# Patient Record
Sex: MALE | Race: WHITE | NOT HISPANIC OR LATINO | Employment: PART TIME | URBAN - METROPOLITAN AREA
[De-identification: names, ages, dates, MRNs, and addresses within clinical notes are randomized per-mention and may not be internally consistent; named-entity substitution may affect disease eponyms.]

---

## 2024-07-15 ENCOUNTER — OFFICE VISIT (OUTPATIENT)
Dept: PRIMARY CARE | Facility: CLINIC | Age: 24
End: 2024-07-15
Payer: COMMERCIAL

## 2024-07-15 DIAGNOSIS — K64.0 GRADE I INTERNAL HEMORRHOIDS: Primary | ICD-10-CM

## 2024-07-15 DIAGNOSIS — K64.8 INTERNAL BLEEDING HEMORRHOIDS: ICD-10-CM

## 2024-07-15 DIAGNOSIS — E66.9 CLASS 2 OBESITY WITH BODY MASS INDEX (BMI) OF 36.0 TO 36.9 IN ADULT, UNSPECIFIED OBESITY TYPE, UNSPECIFIED WHETHER SERIOUS COMORBIDITY PRESENT: ICD-10-CM

## 2024-07-15 DIAGNOSIS — K92.1 BLOOD IN STOOL: ICD-10-CM

## 2024-07-15 PROCEDURE — 99212 OFFICE O/P EST SF 10 MIN: CPT | Performed by: NURSE PRACTITIONER

## 2024-07-15 RX ORDER — TRIAMCINOLONE ACETONIDE 1 MG/G
CREAM TOPICAL 2 TIMES DAILY
Qty: 30 G | Refills: 1 | Status: SHIPPED | OUTPATIENT
Start: 2024-07-15

## 2024-07-15 ASSESSMENT — PAIN SCALES - GENERAL: PAINLEVEL: 3

## 2024-07-15 NOTE — PROGRESS NOTES
"Subjective   Patient ID: Brown Fajardo is a 24 y.o. male who presents for Hemorrhoids.    Pt is in office due to possible hemorrhoids. He states he has pain and discomfort. States this a reoccurring problem.         Review of Systems    Objective   /90 (BP Location: Right arm, Patient Position: Sitting, BP Cuff Size: Large adult)   Pulse 96   Temp 36.8 °C (98.2 °F) (Temporal)   Resp 16   Ht 1.803 m (5' 11\")   Wt 120 kg (264 lb)   SpO2 100%   BMI 36.82 kg/m²     Physical Exam    Assessment/Plan          "

## 2024-07-15 NOTE — PROGRESS NOTES
Subjective   Patient ID: Brown Fajardo is a 24 y.o. male who is with chief complaint of blood in the stools.    HPI  Patient is a 24 y.o. male who CONSULTED AT [facility] today. Patient who is with complaint of blood in the stools which started 2 weeks ago. He described the bleeding as streaks and drips of fresh blood. He has no rectal pain, abdominal pain, fever, chills, diarrhea, nor constipation. He self medicated with preparation H and the symptom disappears. However, 2 days ago, he started to have rectal bleeding again. He has history of hemorrhoids before.     Review of Systems  General: no weight loss, generally healthy, no fatigue  Head:  no headaches / sinus pain, no vertigo, no injury  Eyes: no diplopia, no tearing, no pain,   Ears: no change in hearing, no tinnitus, no bleeding, no vertigo  Mouth:  no dental difficulties, no gingival bleeding, no sore throat, no loss of sense of taste  Nose: no congestion, no  discharge, no bleeding, no obstruction, no loss of sense of smell  Neck: no stiffness, no pain, no tenderness, no masses, no bruit  Pulmonary: no dyspnea, no wheezing, no hemoptysis, no cough  Cardiovascular: no chest pain, no palpitations, no syncope, no orthopnea  Gastrointestinal: no change in appetite, no dysphagia, no abdominal pains, no diarrhea, no emesis, no melena, (+) fresh blood in the stools.   Genito Urinary: no dysuria, no urinary urgency, no nocturia, no incontinence, no change in nature of urine  Musculoskeletal: no muscle ache, no joint pain, no limitation of range of motion, no paresthesia, no numbness  Constitutional: no fever, no chills, no night sweats    Objective   Physical Exam  General: ambulatory, in no acute distress  Head: normocephalic, no lesions  Eyes: pink palpebral conjunctiva, anicteric sclerae, PERRLA, EOM's full  Abdomen: flat, NABS, soft, no direct tenderness, no rebound tenderness, no mass palpated, SIGNS: no Elizabeth, no Rovsings,  no Psoas, no Obturator; No CVA tenderness,  Rectal exam: no perianal mass, no erythema, good sphincteric tone, (+) soft, slightly tender mass on the rectal canal: above sphincter, on the 6 o clock position, smooth surface, well defined border, underneath rectal mucosa, non tender,;;;; no blood, (+) stool on withdrawal of examining finger  Musculoskeletal: no limitation of range of motion, no paralysis, no deformity  Extremities: full and equal peripheral pulses, no edema,    Assessment/Plan   Problem List Items Addressed This Visit    None  Visit Diagnoses         Codes    Grade I internal hemorrhoids    -  Primary K64.0    Relevant Medications    triamcinolone (Kenalog) 0.1 % cream    Internal bleeding hemorrhoids     K64.8    Relevant Medications    triamcinolone (Kenalog) 0.1 % cream    Blood in stool     K92.1    Relevant Medications    triamcinolone (Kenalog) 0.1 % cream    Body mass index (BMI) of 36.0 to 36.9 in adult     Z68.36    Class 2 obesity with body mass index (BMI) of 36.0 to 36.9 in adult, unspecified obesity type, unspecified whether serious comorbidity present     E66.9, Z68.36        DISCHARGE SUMMARY:   Patient was seen and examined. Diagnosis, treatment, treatment options, and possible complications of today's illness discussed and explained to patient. Patient to take medication/s associated with this visit. Patient may also take OTC analgesic as needed for pain. Advised high fiber diet, exercise, and increased oral fluid intake.  Educated and advised on use of hot sitz bath. Advised to come back if with worsening or persistent symptoms. Patient verbalized understanding of plan of care.    Patient to come back in 7 - 10 days if needed for worsening symptoms.           MUKESH Peres-CNP 07/15/24 4:26 PM

## 2024-07-16 VITALS
RESPIRATION RATE: 16 BRPM | TEMPERATURE: 98.2 F | DIASTOLIC BLOOD PRESSURE: 80 MMHG | HEART RATE: 96 BPM | OXYGEN SATURATION: 100 % | BODY MASS INDEX: 36.96 KG/M2 | WEIGHT: 264 LBS | HEIGHT: 71 IN | SYSTOLIC BLOOD PRESSURE: 128 MMHG

## 2024-07-16 ASSESSMENT — ENCOUNTER SYMPTOMS
DEPRESSION: 0
LOSS OF SENSATION IN FEET: 0
OCCASIONAL FEELINGS OF UNSTEADINESS: 0

## 2024-07-16 ASSESSMENT — PATIENT HEALTH QUESTIONNAIRE - PHQ9
SUM OF ALL RESPONSES TO PHQ9 QUESTIONS 1 AND 2: 0
1. LITTLE INTEREST OR PLEASURE IN DOING THINGS: NOT AT ALL
2. FEELING DOWN, DEPRESSED OR HOPELESS: NOT AT ALL

## 2024-07-16 NOTE — PATIENT INSTRUCTIONS
DISCHARGE SUMMARY:   Patient was seen and examined. Diagnosis, treatment, treatment options, and possible complications of today's illness discussed and explained to patient. Patient to take medication/s associated with this visit. Patient may also take OTC analgesic as needed for pain. Advised high fiber diet, exercise, and increased oral fluid intake.  Educated and advised on use of hot sitz bath. Advised to come back if with worsening or persistent symptoms. Patient verbalized understanding of plan of care.    Patient to come back in 7 - 10 days if needed for worsening symptoms.

## 2024-08-15 ENCOUNTER — OFFICE VISIT (OUTPATIENT)
Dept: PRIMARY CARE | Facility: CLINIC | Age: 24
End: 2024-08-15
Payer: COMMERCIAL

## 2024-08-15 VITALS
HEIGHT: 71 IN | SYSTOLIC BLOOD PRESSURE: 120 MMHG | TEMPERATURE: 98.6 F | WEIGHT: 264.55 LBS | DIASTOLIC BLOOD PRESSURE: 80 MMHG | OXYGEN SATURATION: 99 % | HEART RATE: 78 BPM | BODY MASS INDEX: 37.04 KG/M2

## 2024-08-15 DIAGNOSIS — K62.89 ANAL PAIN: Primary | ICD-10-CM

## 2024-08-15 DIAGNOSIS — E66.9 CLASS 2 OBESITY WITH BODY MASS INDEX (BMI) OF 36.0 TO 36.9 IN ADULT, UNSPECIFIED OBESITY TYPE, UNSPECIFIED WHETHER SERIOUS COMORBIDITY PRESENT: ICD-10-CM

## 2024-08-15 DIAGNOSIS — K64.0 GRADE I INTERNAL HEMORRHOIDS: ICD-10-CM

## 2024-08-15 DIAGNOSIS — K60.2 ANAL FISSURE: ICD-10-CM

## 2024-08-15 DIAGNOSIS — K92.1 BLOOD IN STOOL: ICD-10-CM

## 2024-08-15 DIAGNOSIS — K64.8 INTERNAL BLEEDING HEMORRHOIDS: ICD-10-CM

## 2024-08-15 PROCEDURE — 99212 OFFICE O/P EST SF 10 MIN: CPT | Performed by: NURSE PRACTITIONER

## 2024-08-15 RX ORDER — HYDROCORTISONE 25 MG/G
CREAM TOPICAL 2 TIMES DAILY
Qty: 30 G | Refills: 1 | Status: SHIPPED | OUTPATIENT
Start: 2024-08-15 | End: 2024-08-29

## 2024-08-15 ASSESSMENT — ENCOUNTER SYMPTOMS
DEPRESSION: 0
LOSS OF SENSATION IN FEET: 0
OCCASIONAL FEELINGS OF UNSTEADINESS: 0

## 2024-08-15 ASSESSMENT — PAIN SCALES - GENERAL: PAINLEVEL: 3

## 2024-08-15 ASSESSMENT — PATIENT HEALTH QUESTIONNAIRE - PHQ9
2. FEELING DOWN, DEPRESSED OR HOPELESS: NOT AT ALL
SUM OF ALL RESPONSES TO PHQ9 QUESTIONS 1 AND 2: 0
1. LITTLE INTEREST OR PLEASURE IN DOING THINGS: NOT AT ALL

## 2024-08-15 NOTE — PROGRESS NOTES
Subjective   Patient ID: Brown Fajardo is a 24 y.o. male who is here for follow up with regards to his hemorrhoids.    HPI  Patient is a 24 y.o. male who CONSULTED AT [facility] today. Patient was seen at this clinic about 1 month ago and was diagnosed to have internal hemorrhoids and was given triamcinolone cream. H is now here for follow up visit for this condition.    Interval history: Patient states that since last visit, his symptoms were initially better but he now again has fresh blood in the stools becoming more often. He also has some pain in the anal area and he can feel a small break in the skin adjacent to the anal opening. This is painful and tender.     Review of Systems  General: no weight loss, generally healthy, no fatigue  Head:  no headaches / sinus pain, no vertigo, no injury  Eyes: no diplopia, no tearing, no pain,   Ears: no change in hearing, no tinnitus, no bleeding, no vertigo  Mouth:  no dental difficulties, no gingival bleeding, no sore throat, no loss of sense of taste  Nose: no congestion, no  discharge, no bleeding, no obstruction, no loss of sense of smell  Neck: no stiffness, no pain, no tenderness, no masses, no bruit  Pulmonary: no dyspnea, no wheezing, no hemoptysis, no cough  Cardiovascular: no chest pain, no palpitations, no syncope, no orthopnea  Gastrointestinal: no change in appetite, no dysphagia, no abdominal pains, no diarrhea, no emesis, no melena, (+) anal pain and tenderness, (+) rectal bleeding  Genito Urinary: no dysuria, no urinary urgency, no nocturia, no incontinence, no change in nature of urine  Musculoskeletal: no muscle ache, no joint pain, no limitation of range of motion, no paresthesia, no numbness  Constitutional: no fever, no chills, no night sweats    Objective   Physical Exam  General: ambulatory, in no acute distress  Head: normocephalic, no lesions  Eyes: pink palpebral conjunctiva, anicteric sclerae, PERRLA, EOM's  full  Ears: clear external auditory canals, no ear discharge, no bleeding from the ears, tympanic membrane intact  Nose: nasal mucosa normal, no nasal discharge, no bleeding, no obstruction  Throat: clear, no exudate, no lesions  Neck: supple, no masses, no bruits  Chest: symmetrical chest expansion, no lagging, no retractions, clear breath sounds, no rales, no wheezes  Heart: normal rate, regular rhythm, no heaves, no thrills, no murmurs  Abdomen: soft, non-tender, normoactive bowel sounds, no mass palpated  Musculoskeletal: no limitation of range of motion, no paralysis, no deformity  Extremities: full and equal peripheral pulses, no edema,  Rectal exam: no perianal mass, no erythema, good sphincteric tone, (+) small break in the skin on the anal area (anal fissure) measuring 0.5 cm, tender, no bleeding, partial thickness, (+) soft, slightly tender mass on the rectal canal: above sphincter, on the 6 o clock position, smooth surface, well defined border, underneath rectal mucosa, non tender,;;;; no blood, (+) stool on withdrawal of examining finger     Assessment/Plan   Problem List Items Addressed This Visit    None  Visit Diagnoses         Codes    Anal pain    -  Primary K62.89    Relevant Medications    hydrocortisone 2.5 % cream    Grade I internal hemorrhoids     K64.0    Relevant Medications    hydrocortisone 2.5 % cream    Internal bleeding hemorrhoids     K64.8    Relevant Medications    hydrocortisone 2.5 % cream    Blood in stool     K92.1    Relevant Medications    hydrocortisone 2.5 % cream    Anal fissure     K60.2    Relevant Medications    hydrocortisone 2.5 % cream    BMI 36.0-36.9,adult     Z68.36    Class 2 obesity with body mass index (BMI) of 36.0 to 36.9 in adult, unspecified obesity type, unspecified whether serious comorbidity present     E66.9, Z68.36        DISCHARGE SUMMARY:   Patient was seen and examined. Diagnosis, treatment, treatment options, and possible complications of today's  illness discussed and explained to patient. Patient to take medication/s associated with this visit. Patient may also take OTC analgesic as needed for pain. Advised high fiber diet, exercise, and increased oral fluid intake.  Educated and advised on use of hot sitz bath. Advised to come back if with worsening or persistent symptoms. Patient verbalized understanding of plan of care.    Patient to come back in 7 - 10 days if needed for worsening symptoms.           MUKESH Peres-CNP 08/15/24 3:29 PM

## 2024-08-21 ASSESSMENT — PATIENT HEALTH QUESTIONNAIRE - PHQ9
SUM OF ALL RESPONSES TO PHQ9 QUESTIONS 1 AND 2: 0
2. FEELING DOWN, DEPRESSED OR HOPELESS: NOT AT ALL
1. LITTLE INTEREST OR PLEASURE IN DOING THINGS: NOT AT ALL

## 2024-09-27 PROBLEM — R00.2 PALPITATIONS: Status: ACTIVE | Noted: 2024-09-27

## 2024-09-27 PROBLEM — R68.89 FLU-LIKE SYMPTOMS: Status: ACTIVE | Noted: 2024-09-27

## 2024-09-27 PROBLEM — R79.89 HIGH THYROID STIMULATING HORMONE (TSH) LEVEL: Status: ACTIVE | Noted: 2024-09-27

## 2024-09-27 PROBLEM — J02.9 SORE THROAT: Status: ACTIVE | Noted: 2024-09-27

## 2024-09-27 PROBLEM — H66.92 ACUTE LEFT OTITIS MEDIA: Status: ACTIVE | Noted: 2024-09-27

## 2024-10-01 ENCOUNTER — APPOINTMENT (OUTPATIENT)
Dept: PRIMARY CARE | Facility: CLINIC | Age: 24
End: 2024-10-01
Payer: COMMERCIAL

## 2024-10-01 ENCOUNTER — LAB (OUTPATIENT)
Dept: LAB | Facility: LAB | Age: 24
End: 2024-10-01
Payer: COMMERCIAL

## 2024-10-01 VITALS
TEMPERATURE: 97.5 F | WEIGHT: 272 LBS | SYSTOLIC BLOOD PRESSURE: 110 MMHG | RESPIRATION RATE: 14 BRPM | DIASTOLIC BLOOD PRESSURE: 70 MMHG | HEART RATE: 110 BPM | OXYGEN SATURATION: 98 % | BODY MASS INDEX: 38.08 KG/M2 | HEIGHT: 71 IN

## 2024-10-01 DIAGNOSIS — Z00.00 HEALTHCARE MAINTENANCE: Primary | ICD-10-CM

## 2024-10-01 DIAGNOSIS — E66.812 CLASS 2 SEVERE OBESITY DUE TO EXCESS CALORIES WITH SERIOUS COMORBIDITY AND BODY MASS INDEX (BMI) OF 37.0 TO 37.9 IN ADULT: ICD-10-CM

## 2024-10-01 DIAGNOSIS — E66.01 CLASS 2 SEVERE OBESITY DUE TO EXCESS CALORIES WITH SERIOUS COMORBIDITY AND BODY MASS INDEX (BMI) OF 37.0 TO 37.9 IN ADULT: ICD-10-CM

## 2024-10-01 DIAGNOSIS — Z00.00 HEALTHCARE MAINTENANCE: ICD-10-CM

## 2024-10-01 DIAGNOSIS — K60.1 CHRONIC POSTERIOR ANAL FISSURE: ICD-10-CM

## 2024-10-01 DIAGNOSIS — R63.5 WEIGHT GAIN: ICD-10-CM

## 2024-10-01 PROBLEM — R68.89 FLU-LIKE SYMPTOMS: Status: RESOLVED | Noted: 2024-09-27 | Resolved: 2024-10-01

## 2024-10-01 PROBLEM — J02.9 SORE THROAT: Status: RESOLVED | Noted: 2024-09-27 | Resolved: 2024-10-01

## 2024-10-01 PROBLEM — H66.92 ACUTE LEFT OTITIS MEDIA: Status: RESOLVED | Noted: 2024-09-27 | Resolved: 2024-10-01

## 2024-10-01 PROBLEM — K60.2 ANAL FISSURE: Status: ACTIVE | Noted: 2024-10-01

## 2024-10-01 PROBLEM — R00.2 PALPITATIONS: Status: RESOLVED | Noted: 2024-09-27 | Resolved: 2024-10-01

## 2024-10-01 LAB
ALBUMIN SERPL BCP-MCNC: 4.7 G/DL (ref 3.4–5)
ALP SERPL-CCNC: 60 U/L (ref 33–120)
ALT SERPL W P-5'-P-CCNC: 38 U/L (ref 10–52)
ANION GAP SERPL CALC-SCNC: 13 MMOL/L (ref 10–20)
AST SERPL W P-5'-P-CCNC: 19 U/L (ref 9–39)
BASOPHILS # BLD AUTO: 0.02 X10*3/UL (ref 0–0.1)
BASOPHILS NFR BLD AUTO: 0.3 %
BILIRUB SERPL-MCNC: 0.5 MG/DL (ref 0–1.2)
BUN SERPL-MCNC: 14 MG/DL (ref 6–23)
CALCIUM SERPL-MCNC: 9.8 MG/DL (ref 8.6–10.3)
CHLORIDE SERPL-SCNC: 105 MMOL/L (ref 98–107)
CHOLEST SERPL-MCNC: 217 MG/DL (ref 0–199)
CHOLESTEROL/HDL RATIO: 5.5
CO2 SERPL-SCNC: 27 MMOL/L (ref 21–32)
CREAT SERPL-MCNC: 1.11 MG/DL (ref 0.5–1.3)
EGFRCR SERPLBLD CKD-EPI 2021: >90 ML/MIN/1.73M*2
EOSINOPHIL # BLD AUTO: 0.06 X10*3/UL (ref 0–0.7)
EOSINOPHIL NFR BLD AUTO: 0.9 %
ERYTHROCYTE [DISTWIDTH] IN BLOOD BY AUTOMATED COUNT: 12 % (ref 11.5–14.5)
GLUCOSE SERPL-MCNC: 95 MG/DL (ref 74–99)
HCT VFR BLD AUTO: 46.5 % (ref 41–52)
HDLC SERPL-MCNC: 39.3 MG/DL
HGB BLD-MCNC: 16.3 G/DL (ref 13.5–17.5)
IMM GRANULOCYTES # BLD AUTO: 0.02 X10*3/UL (ref 0–0.7)
IMM GRANULOCYTES NFR BLD AUTO: 0.3 % (ref 0–0.9)
LDLC SERPL CALC-MCNC: 141 MG/DL
LYMPHOCYTES # BLD AUTO: 2.3 X10*3/UL (ref 1.2–4.8)
LYMPHOCYTES NFR BLD AUTO: 34.7 %
MCH RBC QN AUTO: 30.1 PG (ref 26–34)
MCHC RBC AUTO-ENTMCNC: 35.1 G/DL (ref 32–36)
MCV RBC AUTO: 86 FL (ref 80–100)
MONOCYTES # BLD AUTO: 0.53 X10*3/UL (ref 0.1–1)
MONOCYTES NFR BLD AUTO: 8 %
NEUTROPHILS # BLD AUTO: 3.7 X10*3/UL (ref 1.2–7.7)
NEUTROPHILS NFR BLD AUTO: 55.8 %
NON HDL CHOLESTEROL: 178 MG/DL (ref 0–149)
NRBC BLD-RTO: 0 /100 WBCS (ref 0–0)
PLATELET # BLD AUTO: 289 X10*3/UL (ref 150–450)
POTASSIUM SERPL-SCNC: 4.7 MMOL/L (ref 3.5–5.3)
PROT SERPL-MCNC: 7.6 G/DL (ref 6.4–8.2)
RBC # BLD AUTO: 5.41 X10*6/UL (ref 4.5–5.9)
SODIUM SERPL-SCNC: 140 MMOL/L (ref 136–145)
TRIGL SERPL-MCNC: 185 MG/DL (ref 0–149)
TSH SERPL-ACNC: 3.08 MIU/L (ref 0.44–3.98)
VLDL: 37 MG/DL (ref 0–40)
WBC # BLD AUTO: 6.6 X10*3/UL (ref 4.4–11.3)

## 2024-10-01 PROCEDURE — 3008F BODY MASS INDEX DOCD: CPT | Performed by: FAMILY MEDICINE

## 2024-10-01 PROCEDURE — 36415 COLL VENOUS BLD VENIPUNCTURE: CPT

## 2024-10-01 PROCEDURE — 85025 COMPLETE CBC W/AUTO DIFF WBC: CPT

## 2024-10-01 PROCEDURE — 99395 PREV VISIT EST AGE 18-39: CPT | Performed by: FAMILY MEDICINE

## 2024-10-01 PROCEDURE — 84443 ASSAY THYROID STIM HORMONE: CPT

## 2024-10-01 PROCEDURE — 80061 LIPID PANEL: CPT

## 2024-10-01 PROCEDURE — 80053 COMPREHEN METABOLIC PANEL: CPT

## 2024-10-01 PROCEDURE — 1036F TOBACCO NON-USER: CPT | Performed by: FAMILY MEDICINE

## 2024-10-01 ASSESSMENT — ENCOUNTER SYMPTOMS
NUMBNESS: 0
ANOREXIA: 0
COUGH: 0
ARTHRALGIAS: 0
RHINORRHEA: 0
JOINT SWELLING: 0
DIARRHEA: 0
SORE THROAT: 0
TREMORS: 0
FREQUENCY: 0
PALPITATIONS: 0
WHEEZING: 0
HEMATURIA: 0
MYALGIAS: 0
DIZZINESS: 0
NAUSEA: 0
ABDOMINAL PAIN: 0
WEAKNESS: 0
VISUAL CHANGE: 0
NECK PAIN: 0
CHILLS: 0
SWOLLEN GLANDS: 0
DYSURIA: 0
DIAPHORESIS: 0
CONSTIPATION: 0
FATIGUE: 0
HEADACHES: 0
CHANGE IN BOWEL HABIT: 1
FEVER: 0
SHORTNESS OF BREATH: 0
VERTIGO: 0
HEMATOCHEZIA: 1
VOMITING: 0

## 2024-10-01 NOTE — PATIENT INSTRUCTIONS
BMI was above normal measurement. Current weight: 123 kg (272 lb)  Weight change since last visit (-) denotes wt loss 7.45 lbs   Weight loss needed to achieve BMI 25: 93.1 Lbs  Weight loss needed to achieve BMI 30: 57.4 Lbs  Advised to Increase physical activity.

## 2024-10-01 NOTE — PROGRESS NOTES
"Subjective   Patient ID: Brown Fajardo is a 24 y.o. male who presents for Annual Exam and Rectal Bleeding.    Patient presents today for annual physical exam.    Rectal Bleeding  This is a new problem. The current episode started more than 1 month ago. The problem occurs intermittently. The problem has been waxing and waning. Associated symptoms include a change in bowel habit. Pertinent negatives include no abdominal pain, anorexia, arthralgias, chest pain, chills, congestion, coughing, diaphoresis, fatigue, fever, headaches, joint swelling, myalgias, nausea, neck pain, numbness, rash, sore throat, swollen glands, urinary symptoms, vertigo, visual change, vomiting or weakness. Associated symptoms comments: Painful Defecation. Exacerbated by: straining with bowel movement.        Review of Systems   Constitutional:  Negative for chills, diaphoresis, fatigue and fever.   HENT:  Negative for congestion, ear pain, nosebleeds, rhinorrhea and sore throat.    Respiratory:  Negative for cough, shortness of breath and wheezing.    Cardiovascular:  Negative for chest pain, palpitations and leg swelling.   Gastrointestinal:  Positive for change in bowel habit and hematochezia. Negative for abdominal pain, anorexia, constipation, diarrhea, nausea and vomiting.   Genitourinary:  Negative for dysuria, frequency and hematuria.   Musculoskeletal:  Negative for arthralgias, joint swelling, myalgias and neck pain.   Skin:  Negative for rash.   Neurological:  Negative for dizziness, vertigo, tremors, weakness, numbness and headaches.       Objective   /70 (BP Location: Left arm, Patient Position: Sitting, BP Cuff Size: Large adult)   Pulse 110   Temp 36.4 °C (97.5 °F)   Resp 14   Ht 1.803 m (5' 11\")   Wt 123 kg (272 lb)   SpO2 98%   BMI 37.94 kg/m²     Physical Exam  Constitutional:       General: He is not in acute distress.     Appearance: Normal appearance.   HENT:      Head: Normocephalic and atraumatic.      " Mouth/Throat:      Mouth: Mucous membranes are moist.      Pharynx: Oropharynx is clear. No oropharyngeal exudate or posterior oropharyngeal erythema.   Eyes:      General: No scleral icterus.     Extraocular Movements: Extraocular movements intact.      Pupils: Pupils are equal, round, and reactive to light.   Cardiovascular:      Rate and Rhythm: Normal rate and regular rhythm.      Pulses: Normal pulses.      Heart sounds: No murmur heard.     No friction rub. No gallop.   Pulmonary:      Effort: Pulmonary effort is normal.      Breath sounds: No wheezing, rhonchi or rales.   Abdominal:      Comments: Rectal exam revealed posterior anal fissure which appears chronic with perianal excoriation.  No digital exam was performed because of the obvious fissure.   Skin:     General: Skin is warm.      Coloration: Skin is not jaundiced or pale.      Findings: No erythema or rash.   Neurological:      General: No focal deficit present.      Mental Status: He is alert and oriented to person, place, and time.      Cranial Nerves: No cranial nerve deficit.      Sensory: No sensory deficit.      Coordination: Coordination normal.      Gait: Gait normal.         Assessment/Plan   Problem List Items Addressed This Visit       Chronic posterior anal fissure     We will Refer him for Surgical Consultation. Recommended Sitz Baths for symptom relief.         Relevant Medications    nifedipine 0.2% ointment - Compounded - Outpatient    Other Relevant Orders    Referral to General Surgery    Class 2 severe obesity due to excess calories with serious comorbidity and body mass index (BMI) of 37.0 to 37.9 in adult     Continue decrease calorie diet and not more than 1500 calorie per day diet and low-fat diet.  Continue with regular exercise program.  We advised exercise at least 5 days a week for at least 45 minutes and also a minimum of 10,000 steps a day.  The detrimental effects of obesity on health were discussed.         Healthcare  maintenance - Primary     Recommend low-cholesterol diet, low-fat diet and low-salt diet.  The need for lifelong dietary compliance in order to reduce cardiac risk is recommended.  We will also recommend regular exercise program to improve lipid balance and overall health.  Recommend decreasing fat and cholesterol in diet, increasing aerobic exercise with a goal of 4 or more days per week         Relevant Orders    CBC and Auto Differential    Comprehensive Metabolic Panel    Lipid Panel    Weight gain     We will order labs for further evaluation.         Relevant Orders    TSH with reflex to Free T4 if abnormal     Scribe Attestation  By signing my name below, IGerry Scribe   attest that this documentation has been prepared under the direction and in the presence of Alex Xavier MD.

## 2024-10-10 ENCOUNTER — APPOINTMENT (OUTPATIENT)
Dept: SURGERY | Facility: CLINIC | Age: 24
End: 2024-10-10
Payer: COMMERCIAL

## 2024-10-10 VITALS
WEIGHT: 270 LBS | SYSTOLIC BLOOD PRESSURE: 122 MMHG | DIASTOLIC BLOOD PRESSURE: 80 MMHG | BODY MASS INDEX: 37.8 KG/M2 | HEIGHT: 71 IN

## 2024-10-10 DIAGNOSIS — K60.1 CHRONIC POSTERIOR ANAL FISSURE: ICD-10-CM

## 2024-10-10 PROCEDURE — 3008F BODY MASS INDEX DOCD: CPT | Performed by: SURGERY

## 2024-10-10 PROCEDURE — 99203 OFFICE O/P NEW LOW 30 MIN: CPT | Performed by: SURGERY

## 2024-10-10 NOTE — PATIENT INSTRUCTIONS
Start the Metamucil (2 tablet spoons in 12 oz of water daily); sit in warm water daily; use the ointment TWICE DAILY ; followup in 2 months

## 2024-10-10 NOTE — PROGRESS NOTES
Subjective   Patient ID: Brown Fajardo is a 24 y.o. male who presents for No chief complaint on file..  HPI  24-year-old morbidly obese male patient with history of perianal fissure for at least a year with intermittent flare up once per month. He saw his PCP on 10/1 and was started on nifedipine 0.25% ointment and sitz bath. His symptoms consist of sharp perianal pain and pressure especially with bowel movement. He reports having hard bowel movements at times.  He used Metamucil in the past but stopped it.  Denies trauma to the perianal region.  Denies blood in his stool      Objective   Physical Exam  Gen: no acute distress  CV: RRR  Pulm: CTAB  Perianal exam: fissure in posterior midline   Ext: moves all 4 extremities, sensation and strength intact   Assessment/Plan   Finding consistent with perianal fissure.  We talked about management with Metamucil, ointment and sitz bath.  He will be started on 0.4% nifedipine/2% lidocaine /1.5% hydrocortisone ointment BID; we talked about the importance of increasing his fluid intake, high-fiber diet and fiber supplement. Followup in 2 months         Alex Chairez MD 10/10/24 9:20 AM

## 2024-11-13 ENCOUNTER — APPOINTMENT (OUTPATIENT)
Dept: PRIMARY CARE | Facility: CLINIC | Age: 24
End: 2024-11-13
Payer: COMMERCIAL

## 2024-11-13 VITALS
DIASTOLIC BLOOD PRESSURE: 80 MMHG | WEIGHT: 264 LBS | SYSTOLIC BLOOD PRESSURE: 146 MMHG | HEIGHT: 71 IN | OXYGEN SATURATION: 100 % | HEART RATE: 98 BPM | TEMPERATURE: 97.7 F | BODY MASS INDEX: 36.96 KG/M2 | RESPIRATION RATE: 14 BRPM

## 2024-11-13 DIAGNOSIS — L30.1 DYSHIDROTIC ECZEMA: ICD-10-CM

## 2024-11-13 DIAGNOSIS — E66.812 CLASS 2 SEVERE OBESITY DUE TO EXCESS CALORIES WITH SERIOUS COMORBIDITY AND BODY MASS INDEX (BMI) OF 36.0 TO 36.9 IN ADULT: ICD-10-CM

## 2024-11-13 DIAGNOSIS — E78.5 DYSLIPIDEMIA: ICD-10-CM

## 2024-11-13 DIAGNOSIS — E66.01 CLASS 2 SEVERE OBESITY DUE TO EXCESS CALORIES WITH SERIOUS COMORBIDITY AND BODY MASS INDEX (BMI) OF 36.0 TO 36.9 IN ADULT: ICD-10-CM

## 2024-11-13 DIAGNOSIS — L91.8 SKIN TAG: ICD-10-CM

## 2024-11-13 DIAGNOSIS — K60.1 CHRONIC POSTERIOR ANAL FISSURE: Primary | ICD-10-CM

## 2024-11-13 PROCEDURE — 99213 OFFICE O/P EST LOW 20 MIN: CPT | Performed by: FAMILY MEDICINE

## 2024-11-13 PROCEDURE — 3008F BODY MASS INDEX DOCD: CPT | Performed by: FAMILY MEDICINE

## 2024-11-13 PROCEDURE — 1036F TOBACCO NON-USER: CPT | Performed by: FAMILY MEDICINE

## 2024-11-13 RX ORDER — MOMETASONE FUROATE 1 MG/G
CREAM TOPICAL DAILY
Qty: 45 G | Refills: 0 | Status: SHIPPED | OUTPATIENT
Start: 2024-11-13

## 2024-11-13 ASSESSMENT — ENCOUNTER SYMPTOMS
NECK PAIN: 0
SORE THROAT: 0
WOUND: 0
BLOOD IN STOOL: 1
DIAPHORESIS: 0
WEAKNESS: 0
TREMORS: 0
FATIGUE: 0
ABDOMINAL PAIN: 0
PALPITATIONS: 0
CONSTIPATION: 1
FEVER: 0
CHANGE IN BOWEL HABIT: 0
VERTIGO: 0
COUGH: 0
FREQUENCY: 0
WHEEZING: 0
JOINT SWELLING: 0
VISUAL CHANGE: 0
MYALGIAS: 0
SWOLLEN GLANDS: 0
DIARRHEA: 0
HEADACHES: 0
ARTHRALGIAS: 0
POLYPHAGIA: 0
NUMBNESS: 0
POLYDIPSIA: 0
RHINORRHEA: 1
VOMITING: 0
HEMATOCHEZIA: 1
NAUSEA: 0
DIZZINESS: 0
CHILLS: 0
HEMATURIA: 0
SHORTNESS OF BREATH: 0
DYSURIA: 0
ANOREXIA: 0

## 2024-11-13 NOTE — PATIENT INSTRUCTIONS
BMI was above normal measurement. Current weight: 120 kg (264 lb)  Weight change since last visit (-) denotes wt loss -6 lbs   Weight loss needed to achieve BMI 25: 85.1 Lbs  Weight loss needed to achieve BMI 30: 49.4 Lbs  Advised to Increase physical activity.

## 2024-11-13 NOTE — ASSESSMENT & PLAN NOTE
We will have him start applying OTC hydrocortisone cream to the affected area. Follow-up if persistent or worsening symptoms otherwise when necessary.

## 2024-11-13 NOTE — PROGRESS NOTES
Subjective   Patient ID: Brown Fajardo is a 24 y.o. male who presents for Follow-up (Anal Fissure) and Skin Problem.    He complains of a raised area on the inside of his left hand. He has no associated pain.     He complains of excess phlegm. This has been a chronic issue and is worse in the morning.    He also complains of a skin tag in his pubic region. He states he cut it off and the area is purplish around it    Rectal Bleeding  The problem has been resolved. Associated symptoms include a rash. Pertinent negatives include no abdominal pain, anorexia, arthralgias, change in bowel habit, chest pain, chills, congestion, coughing, diaphoresis, fatigue, fever, headaches, joint swelling, myalgias, nausea, neck pain, numbness, sore throat, swollen glands, urinary symptoms, vertigo, visual change, vomiting or weakness. Treatments tried: wiping with baby wipes. The treatment provided significant relief.        Review of Systems   Constitutional:  Negative for chills, diaphoresis, fatigue and fever.   HENT:  Positive for postnasal drip and rhinorrhea. Negative for congestion, ear pain, nosebleeds and sore throat.    Respiratory:  Negative for cough, shortness of breath and wheezing.    Cardiovascular:  Negative for chest pain, palpitations and leg swelling.   Gastrointestinal:  Positive for blood in stool, constipation and hematochezia. Negative for abdominal pain, anorexia, change in bowel habit, diarrhea, nausea and vomiting.   Endocrine: Negative for cold intolerance, heat intolerance, polydipsia, polyphagia and polyuria.   Genitourinary:  Negative for dysuria, frequency and hematuria.   Musculoskeletal:  Negative for arthralgias, joint swelling, myalgias and neck pain.   Skin:  Positive for rash. Negative for wound.   Neurological:  Negative for dizziness, vertigo, tremors, weakness, numbness and headaches.       Objective   /80 (BP Location: Left arm, Patient Position: Sitting, BP Cuff Size: Large adult  "long)   Pulse 98   Temp 36.5 °C (97.7 °F)   Resp 14   Ht 1.803 m (5' 11\")   Wt 120 kg (264 lb)   SpO2 100%   BMI 36.82 kg/m²     Physical Exam  Constitutional:       General: He is not in acute distress.     Appearance: Normal appearance.   HENT:      Head: Normocephalic and atraumatic.      Mouth/Throat:      Mouth: Mucous membranes are moist.      Pharynx: Oropharynx is clear. No oropharyngeal exudate or posterior oropharyngeal erythema.   Eyes:      General: No scleral icterus.     Extraocular Movements: Extraocular movements intact.      Pupils: Pupils are equal, round, and reactive to light.   Cardiovascular:      Rate and Rhythm: Normal rate and regular rhythm.      Pulses: Normal pulses.      Heart sounds: No murmur heard.     No friction rub. No gallop.   Pulmonary:      Effort: Pulmonary effort is normal.      Breath sounds: No wheezing, rhonchi or rales.   Skin:     General: Skin is warm.      Coloration: Skin is not jaundiced or pale.      Findings: Lesion present. No erythema or rash.      Comments: Erythematous scaly rash on the palm of the right hand.  Also has a small sessile skin tag over the pubic region.   Neurological:      General: No focal deficit present.      Mental Status: He is alert and oriented to person, place, and time.      Cranial Nerves: No cranial nerve deficit.      Sensory: No sensory deficit.      Coordination: Coordination normal.      Gait: Gait normal.       Lab on 10/01/2024   Component Date Value Ref Range Status    WBC 10/01/2024 6.6  4.4 - 11.3 x10*3/uL Final    nRBC 10/01/2024 0.0  0.0 - 0.0 /100 WBCs Final    RBC 10/01/2024 5.41  4.50 - 5.90 x10*6/uL Final    Hemoglobin 10/01/2024 16.3  13.5 - 17.5 g/dL Final    Hematocrit 10/01/2024 46.5  41.0 - 52.0 % Final    MCV 10/01/2024 86  80 - 100 fL Final    MCH 10/01/2024 30.1  26.0 - 34.0 pg Final    MCHC 10/01/2024 35.1  32.0 - 36.0 g/dL Final    RDW 10/01/2024 12.0  11.5 - 14.5 % Final    Platelets 10/01/2024 289  150 " - 450 x10*3/uL Final    Neutrophils % 10/01/2024 55.8  40.0 - 80.0 % Final    Immature Granulocytes %, Automated 10/01/2024 0.3  0.0 - 0.9 % Final    Immature Granulocyte Count (IG) includes promyelocytes, myelocytes and metamyelocytes but does not include bands. Percent differential counts (%) should be interpreted in the context of the absolute cell counts (cells/UL).    Lymphocytes % 10/01/2024 34.7  13.0 - 44.0 % Final    Monocytes % 10/01/2024 8.0  2.0 - 10.0 % Final    Eosinophils % 10/01/2024 0.9  0.0 - 6.0 % Final    Basophils % 10/01/2024 0.3  0.0 - 2.0 % Final    Neutrophils Absolute 10/01/2024 3.70  1.20 - 7.70 x10*3/uL Final    Percent differential counts (%) should be interpreted in the context of the absolute cell counts (cells/uL).    Immature Granulocytes Absolute, Au* 10/01/2024 0.02  0.00 - 0.70 x10*3/uL Final    Lymphocytes Absolute 10/01/2024 2.30  1.20 - 4.80 x10*3/uL Final    Monocytes Absolute 10/01/2024 0.53  0.10 - 1.00 x10*3/uL Final    Eosinophils Absolute 10/01/2024 0.06  0.00 - 0.70 x10*3/uL Final    Basophils Absolute 10/01/2024 0.02  0.00 - 0.10 x10*3/uL Final    Glucose 10/01/2024 95  74 - 99 mg/dL Final    Sodium 10/01/2024 140  136 - 145 mmol/L Final    Potassium 10/01/2024 4.7  3.5 - 5.3 mmol/L Final    Chloride 10/01/2024 105  98 - 107 mmol/L Final    Bicarbonate 10/01/2024 27  21 - 32 mmol/L Final    Anion Gap 10/01/2024 13  10 - 20 mmol/L Final    Urea Nitrogen 10/01/2024 14  6 - 23 mg/dL Final    Creatinine 10/01/2024 1.11  0.50 - 1.30 mg/dL Final    eGFR 10/01/2024 >90  >60 mL/min/1.73m*2 Final    Calculations of estimated GFR are performed using the 2021 CKD-EPI Study Refit equation without the race variable for the IDMS-Traceable creatinine methods.  https://jasn.asnjournals.org/content/early/2021/09/22/ASN.1119335828    Calcium 10/01/2024 9.8  8.6 - 10.3 mg/dL Final    Albumin 10/01/2024 4.7  3.4 - 5.0 g/dL Final    Alkaline Phosphatase 10/01/2024 60  33 - 120 U/L Final     Total Protein 10/01/2024 7.6  6.4 - 8.2 g/dL Final    AST 10/01/2024 19  9 - 39 U/L Final    Bilirubin, Total 10/01/2024 0.5  0.0 - 1.2 mg/dL Final    ALT 10/01/2024 38  10 - 52 U/L Final    Patients treated with Sulfasalazine may generate falsely decreased results for ALT.    Cholesterol 10/01/2024 217 (H)  0 - 199 mg/dL Final          Age      Desirable   Borderline High   High     0-19 Y     0 - 169       170 - 199     >/= 200    20-24 Y     0 - 189       190 - 224     >/= 225         >24 Y     0 - 199       200 - 239     >/= 240   **All ranges are based on fasting samples. Specific   therapeutic targets will vary based on patient-specific   cardiac risk.    Pediatric guidelines reference:Pediatrics 2011, 128(S5).Adult guidelines reference: NCEP ATPIII Guidelines,MARLO 2001, 258:2486-97    Venipuncture immediately after or during the administration of Metamizole may lead to falsely low results. Testing should be performed immediately prior to Metamizole dosing.    HDL-Cholesterol 10/01/2024 39.3  mg/dL Final      Age       Very Low   Low     Normal    High    0-19 Y    < 35      < 40     40-45     ----  20-24 Y    ----     < 40      >45      ----        >24 Y      ----     < 40     40-60      >60      Cholesterol/HDL Ratio 10/01/2024 5.5   Final      Ref Values  Desirable  < 3.4  High Risk  > 5.0    LDL Calculated 10/01/2024 141 (H)  <=119 mg/dL Final                                Near   Borderline      AGE      Desirable  Optimal    High     High     Very High     0-19 Y     0 - 109     ---    110-129   >/= 130     ----    20-24 Y     0 - 119     ---    120-159   >/= 160     ----      >24 Y     0 -  99   100-129  130-159   160-189     >/=190      VLDL 10/01/2024 37  0 - 40 mg/dL Final    Triglycerides 10/01/2024 185 (H)  0 - 149 mg/dL Final       Age         Desirable   Borderline High   High     Very High   0 D-90 D    19 - 174         ----         ----        ----  91 D- 9 Y     0 -  74        75 -  99     >/=  100      ----    10-19 Y     0 -  89        90 - 129     >/= 130      ----    20-24 Y     0 - 114       115 - 149     >/= 150      ----         >24 Y     0 - 149       150 - 199    200- 499    >/= 500    Venipuncture immediately after or during the administration of Metamizole may lead to falsely low results. Testing should be performed immediately prior to Metamizole dosing.    Non HDL Cholesterol 10/01/2024 178 (H)  0 - 149 mg/dL Final          Age       Desirable   Borderline High   High     Very High     0-19 Y     0 - 119       120 - 144     >/= 145    >/= 160    20-24 Y     0 - 149       150 - 189     >/= 190      ----         >24 Y    30 mg/dL above LDL Cholesterol goal      Thyroid Stimulating Hormone 10/01/2024 3.08  0.44 - 3.98 mIU/L Final      Assessment/Plan   Problem List Items Addressed This Visit       Chronic posterior anal fissure - Primary     Continue to follow high fiber diet and use baby wipes to clean after bowel movements.          Class 2 severe obesity due to excess calories with serious comorbidity and body mass index (BMI) of 36.0 to 36.9 in adult     Continue decrease calorie diet and not more than 1500 calorie per day diet and low-fat diet.  Continue with regular exercise program.  We advised exercise at least 5 days a week for at least 45 minutes and also a minimum of 10,000 steps a day.  The detrimental effects of obesity on health were discussed.         Dyshidrotic eczema     We will have him start applying OTC hydrocortisone cream to the affected area. Follow-up if persistent or worsening symptoms otherwise when necessary.         Dyslipidemia     The nature of cardiac risk has been fully discussed with this patient. Discussed cardiovascular risk analysis and appropriate diet with the need for lifelong measures to reduce the risk. A regular exercise program is recommended to help achieve and maintain normal body weight, fitness and improve lipid balance. Patient education provided.  They understand and agree with this course of treatment. They will return with new or worsening symptoms. Patient instructed to remain current with appropriate annual health maintenance.          Skin tag     Follow-up if persistent or worsening symptoms otherwise when necessary.          Scribe Attestation  By signing my name below, IGerry Scribe   attest that this documentation has been prepared under the direction and in the presence of Alex Xavier MD.

## 2024-12-19 ENCOUNTER — APPOINTMENT (OUTPATIENT)
Dept: SURGERY | Facility: CLINIC | Age: 24
End: 2024-12-19
Payer: COMMERCIAL

## 2024-12-19 DIAGNOSIS — K60.2 PERIANAL FISSURE: Primary | ICD-10-CM

## 2024-12-19 PROCEDURE — 99213 OFFICE O/P EST LOW 20 MIN: CPT | Performed by: SURGERY

## 2024-12-19 NOTE — PROGRESS NOTES
"Subjective   Patient ID: Brown Fajardo is a 24 y.o. male who presents for No chief complaint on file..  HPI  24-year-old male patient who I saw on October 10 for perianal fissure. He was supposed to start 0.4% nifedipine/2% lidocaine/1.5% hydrocortisone but has not. Initially, he was taking the Metamucil and increasing his fiber intake consistently but then stopped. Per him, \"after Thanksgiving, I have been lazy and eating the wrong foods\". He reports intermittent perianal sharp pain with hard bowel movements which he continues to have. Denies blood in stool.    Objective   Physical Exam  Gen: no acute distress  CV: RRR  Pulm: CTAB  Perianal exam: fissure in posterior midline, no bleeding    Assessment/Plan   I talked with him again about the importance of managing the constipation with high fiber diet, fiber supplement ( Metamucil twice daily). He told him to get the ointment and start using it. Follow-up in 2 months         Alex Chairez MD 12/19/24 2:00 PM   "

## 2025-01-14 ENCOUNTER — LAB (OUTPATIENT)
Dept: LAB | Facility: LAB | Age: 25
End: 2025-01-14
Payer: COMMERCIAL

## 2025-01-14 ENCOUNTER — APPOINTMENT (OUTPATIENT)
Dept: PRIMARY CARE | Facility: CLINIC | Age: 25
End: 2025-01-14
Payer: COMMERCIAL

## 2025-01-14 VITALS
DIASTOLIC BLOOD PRESSURE: 80 MMHG | WEIGHT: 260.6 LBS | HEIGHT: 71 IN | RESPIRATION RATE: 18 BRPM | BODY MASS INDEX: 36.48 KG/M2 | TEMPERATURE: 97.8 F | SYSTOLIC BLOOD PRESSURE: 126 MMHG | HEART RATE: 100 BPM | OXYGEN SATURATION: 96 %

## 2025-01-14 DIAGNOSIS — E78.5 DYSLIPIDEMIA: Primary | ICD-10-CM

## 2025-01-14 DIAGNOSIS — E66.01 CLASS 2 SEVERE OBESITY DUE TO EXCESS CALORIES WITH SERIOUS COMORBIDITY AND BODY MASS INDEX (BMI) OF 36.0 TO 36.9 IN ADULT: ICD-10-CM

## 2025-01-14 DIAGNOSIS — R53.82 CHRONIC FATIGUE: ICD-10-CM

## 2025-01-14 DIAGNOSIS — E78.5 DYSLIPIDEMIA: ICD-10-CM

## 2025-01-14 DIAGNOSIS — E66.812 CLASS 2 SEVERE OBESITY DUE TO EXCESS CALORIES WITH SERIOUS COMORBIDITY AND BODY MASS INDEX (BMI) OF 36.0 TO 36.9 IN ADULT: ICD-10-CM

## 2025-01-14 DIAGNOSIS — K60.1 CHRONIC POSTERIOR ANAL FISSURE: ICD-10-CM

## 2025-01-14 PROBLEM — E07.9 THYROID DYSFUNCTION: Status: ACTIVE | Noted: 2025-01-14

## 2025-01-14 LAB
25(OH)D3 SERPL-MCNC: 10 NG/ML (ref 30–100)
CHOLEST SERPL-MCNC: 185 MG/DL (ref 0–199)
CHOLESTEROL/HDL RATIO: 4.9
HDLC SERPL-MCNC: 37.5 MG/DL
LDLC SERPL CALC-MCNC: 121 MG/DL
NON HDL CHOLESTEROL: 148 MG/DL (ref 0–149)
TRIGL SERPL-MCNC: 131 MG/DL (ref 0–149)
TSH SERPL-ACNC: 2.33 MIU/L (ref 0.44–3.98)
VIT B12 SERPL-MCNC: 302 PG/ML (ref 211–911)
VLDL: 26 MG/DL (ref 0–40)

## 2025-01-14 PROCEDURE — 3008F BODY MASS INDEX DOCD: CPT | Performed by: FAMILY MEDICINE

## 2025-01-14 PROCEDURE — 1036F TOBACCO NON-USER: CPT | Performed by: FAMILY MEDICINE

## 2025-01-14 PROCEDURE — 84443 ASSAY THYROID STIM HORMONE: CPT

## 2025-01-14 PROCEDURE — 82607 VITAMIN B-12: CPT

## 2025-01-14 PROCEDURE — 82306 VITAMIN D 25 HYDROXY: CPT

## 2025-01-14 PROCEDURE — 80061 LIPID PANEL: CPT

## 2025-01-14 PROCEDURE — 99213 OFFICE O/P EST LOW 20 MIN: CPT | Performed by: FAMILY MEDICINE

## 2025-01-14 ASSESSMENT — ENCOUNTER SYMPTOMS
ABDOMINAL PAIN: 0
HEMATURIA: 0
POLYDIPSIA: 0
HEADACHES: 0
NUMBNESS: 0
WHEEZING: 0
FREQUENCY: 0
FATIGUE: 1
DYSURIA: 0
VOMITING: 0
PALPITATIONS: 0
DIZZINESS: 0
CONSTIPATION: 0
TREMORS: 0
COUGH: 0
FEVER: 0
SORE THROAT: 0
CHILLS: 0
POLYPHAGIA: 0
SHORTNESS OF BREATH: 0
RHINORRHEA: 0
DIARRHEA: 0

## 2025-01-14 NOTE — PATIENT INSTRUCTIONS
BMI was above normal measurement. Current weight: 118 kg (260 lb 9.6 oz)  Weight change since last visit (-) denotes wt loss -3.4 lbs   Weight loss needed to achieve BMI 25: 81.7 Lbs  Weight loss needed to achieve BMI 30: 46 Lbs  Advised to Increase physical activity.

## 2025-01-14 NOTE — ASSESSMENT & PLAN NOTE
We will have him continue to follow a high fiber diet and use baby wipes to clean after bowel movements.

## 2025-01-14 NOTE — PROGRESS NOTES
"Subjective   Patient ID: Brown Fajardo is a 24 y.o. male who presents for Follow-up (Dyslipidemia and Anal Fissure) and Fatigue.    Patient presents for follow up on Dyslipidemia. He has no chest pain, palpitations, dyspnea, orthopnea, PND or peripheral edema.    He complains of a dark spot under his nail on his left index finger.    Fatigue  This is a recurrent problem. The current episode started 1 to 4 weeks ago. The problem occurs constantly. Associated symptoms include fatigue. Pertinent negatives include no abdominal pain, chest pain, chills, congestion, coughing, fever, headaches, numbness, sore throat or vomiting. Nothing aggravates the symptoms. He has tried nothing for the symptoms. The treatment provided no relief.        Review of Systems   Constitutional:  Positive for fatigue. Negative for chills and fever.   HENT:  Negative for congestion, ear pain, nosebleeds, rhinorrhea and sore throat.    Respiratory:  Negative for cough, shortness of breath and wheezing.    Cardiovascular:  Negative for chest pain, palpitations and leg swelling.   Gastrointestinal:  Negative for abdominal pain, constipation, diarrhea and vomiting.   Endocrine: Negative for polydipsia, polyphagia and polyuria.   Genitourinary:  Negative for dysuria, frequency and hematuria.   Neurological:  Negative for dizziness, tremors, numbness and headaches.       Objective   /80 (BP Location: Left arm, Patient Position: Sitting)   Pulse 100   Temp 36.6 °C (97.8 °F)   Resp 18   Ht 1.803 m (5' 11\")   Wt 118 kg (260 lb 9.6 oz)   SpO2 96%   BMI 36.35 kg/m²     Physical Exam  Constitutional:       General: He is not in acute distress.     Appearance: Normal appearance.   HENT:      Head: Normocephalic and atraumatic.      Mouth/Throat:      Mouth: Mucous membranes are moist.      Pharynx: Oropharynx is clear. No oropharyngeal exudate or posterior oropharyngeal erythema.   Eyes:      General: No scleral icterus.     Extraocular " Movements: Extraocular movements intact.      Pupils: Pupils are equal, round, and reactive to light.   Cardiovascular:      Rate and Rhythm: Normal rate and regular rhythm.      Pulses: Normal pulses.      Heart sounds: No murmur heard.     No friction rub. No gallop.   Pulmonary:      Effort: Pulmonary effort is normal.      Breath sounds: No wheezing, rhonchi or rales.   Skin:     General: Skin is warm.      Coloration: Skin is not jaundiced or pale.      Findings: No erythema or rash.   Neurological:      General: No focal deficit present.      Mental Status: He is alert and oriented to person, place, and time.      Cranial Nerves: No cranial nerve deficit.      Sensory: No sensory deficit.      Coordination: Coordination normal.      Gait: Gait normal.         Assessment/Plan   Problem List Items Addressed This Visit       Chronic fatigue     We will order labs for further evaluation.         Relevant Orders    TSH with reflex to Free T4 if abnormal    Vitamin B12    Vitamin D 25-Hydroxy,Total (for eval of Vitamin D levels)    Chronic posterior anal fissure     We will have him continue to follow a high fiber diet and use baby wipes to clean after bowel movements.          Class 2 severe obesity due to excess calories with serious comorbidity and body mass index (BMI) of 36.0 to 36.9 in adult     Continue decrease calorie diet and not more than 1500 calorie per day diet and low-fat diet.  Continue with regular exercise program.  We advised exercise at least 5 days a week for at least 45 minutes and also a minimum of 10,000 steps a day.  The detrimental effects of obesity on health were discussed.         Dyslipidemia - Primary     The nature of cardiac risk has been fully discussed with this patient. Discussed cardiovascular risk analysis and appropriate diet with the need for lifelong measures to reduce the risk. A regular exercise program is recommended to help achieve and maintain normal body weight, fitness  and improve lipid balance. Patient education provided. They understand and agree with this course of treatment. They will return with new or worsening symptoms. Patient instructed to remain current with appropriate annual health maintenance.          Relevant Orders    Lipid Panel     Scribe Attestation  By signing my name below, IGerry, Scribanselmo   attest that this documentation has been prepared under the direction and in the presence of Alex Xavier MD.

## 2025-01-23 ENCOUNTER — TELEPHONE (OUTPATIENT)
Dept: SURGERY | Facility: CLINIC | Age: 25
End: 2025-01-23
Payer: COMMERCIAL

## 2025-01-23 NOTE — TELEPHONE ENCOUNTER
I called and spoke with patient; he will take the Metamucil (2 table spoons)  in AM and increase fruits/vegetables in take

## 2025-02-17 ENCOUNTER — OFFICE VISIT (OUTPATIENT)
Dept: SURGERY | Facility: CLINIC | Age: 25
End: 2025-02-17
Payer: COMMERCIAL

## 2025-02-17 ENCOUNTER — APPOINTMENT (OUTPATIENT)
Dept: SURGERY | Facility: CLINIC | Age: 25
End: 2025-02-17
Payer: COMMERCIAL

## 2025-02-17 VITALS — HEIGHT: 71 IN | BODY MASS INDEX: 36.4 KG/M2 | WEIGHT: 260 LBS

## 2025-02-17 DIAGNOSIS — K60.1 CHRONIC POSTERIOR ANAL FISSURE: Primary | ICD-10-CM

## 2025-02-17 PROCEDURE — 99213 OFFICE O/P EST LOW 20 MIN: CPT | Performed by: SURGERY

## 2025-02-17 PROCEDURE — 3008F BODY MASS INDEX DOCD: CPT | Performed by: SURGERY

## 2025-02-17 RX ORDER — BUSPIRONE HYDROCHLORIDE 10 MG/1
10 TABLET ORAL
COMMUNITY

## 2025-02-17 RX ORDER — VIT C/E/ZN/COPPR/LUTEIN/ZEAXAN 250MG-90MG
25 CAPSULE ORAL DAILY
COMMUNITY

## 2025-02-17 NOTE — PROGRESS NOTES
Subjective   Patient ID: Brown Fajardo is a 25 y.o. male who presents for Follow-up (Follow up).  HPI  Patient is here in follow-up for his perianal fissure.  He has been on nifedipine/lidocaine ointment and fiber supplement.  He is having bowel movements daily.  Perianal pain has resolved for the past week.  Weeks prior, he had excessive gas which has since resolved.      Objective   Physical Exam  Gen: no acute distress  CV: RRR  Pulm: CTAB  Perianal: healing fissure posterior midline, very minimal tenderness on VERNON  Assessment/Plan   Patient continues to do well.  He will continue the nifedipine/lidocaine/hydrocortisone ointment and fiber supplement.  He will call in March to provide update.         Alex Chairez MD 02/17/25 11:28 AM

## 2025-02-20 ENCOUNTER — APPOINTMENT (OUTPATIENT)
Dept: SURGERY | Facility: CLINIC | Age: 25
End: 2025-02-20
Payer: COMMERCIAL

## 2025-06-23 ENCOUNTER — APPOINTMENT (OUTPATIENT)
Dept: SURGERY | Facility: CLINIC | Age: 25
End: 2025-06-23
Payer: COMMERCIAL